# Patient Record
Sex: MALE
[De-identification: names, ages, dates, MRNs, and addresses within clinical notes are randomized per-mention and may not be internally consistent; named-entity substitution may affect disease eponyms.]

---

## 2020-11-12 ENCOUNTER — HOSPITAL ENCOUNTER (EMERGENCY)
Dept: HOSPITAL 88 - ER | Age: 40
Discharge: HOME | End: 2020-11-12
Payer: SELF-PAY

## 2020-11-12 VITALS — DIASTOLIC BLOOD PRESSURE: 74 MMHG | SYSTOLIC BLOOD PRESSURE: 114 MMHG

## 2020-11-12 VITALS — BODY MASS INDEX: 21.48 KG/M2 | WEIGHT: 145 LBS | HEIGHT: 69 IN

## 2020-11-12 DIAGNOSIS — F17.210: ICD-10-CM

## 2020-11-12 DIAGNOSIS — F15.10: Primary | ICD-10-CM

## 2020-11-12 PROCEDURE — 99283 EMERGENCY DEPT VISIT LOW MDM: CPT

## 2020-11-12 NOTE — XMS REPORT
Continuity of Care Document

                             Created on: 2020



LESLY CREWS

External Reference #: [id number]

: 1980

Sex: Male



Demographics





                          [address]                   [address] [place] [address] [place]  [address]

 

                          Home Phone                [phone]

 

                          Preferred Language        English

 

                          Marital Status            Unknown

 

                          Holiness Affiliation     Unknown

 

                          Race                      Unknown

 

                          Ethnic Group              Unknown





Author





                          Author                    [facility]

 

                          Organization              [facility]

 

                          Address                   [address]

[place]  [address]



 

                          Phone                     Unavailable







Support





                Name            Relationship    Address         Phone

 

                    NEHA COOLEY   PRS                 [address]

[address]

[place]  [address]                     [phone]

 

                    NEHA COOLEY   PRS                 [address]

[address]

[place]  [address]                     [phone]

 

                    SAMMY MCKINNEY     PRS                 [address]

[place]  [address]                     [phone]

 

                    NEHA COOLEY   PRS                 [address]

[place]  [address]                     [phone]







Care Team Providers





                    Care Team Member Name Role                Phone

 

                          Unavailable               Unavailable







Payers





           Payer Name Policy Type Policy Number Effective Date Expiration Date S

ource







Problems

This patient has no known problems.



Allergies, Adverse Reactions, Alerts





        Allergy Name Allergy Type Status  Severity Reaction(s) Onset Date Inacti

ve Date 

Treating Clinician        Comments                  Source

 

       No Known Allergies DA     Active U             2019 00:00:00       

               [facility]

 

       No Known Allergies DA     Active U             2019 00:00:00       

               [facility]

 

       No Known Allergies DA     Active U             2019 00:00:00       

               [facility]

 

       No Known Allergies DA     Active U             2017 00:00:00       

               [facility]







Medications

This patient has no known medications.



Procedures

This patient has no known procedures.



Results





           Test Description Test Time  Test Comments Results    Result Comments 

Source

 

                    VANCOMYCIN          2019 10:15:00   

 

                                        Test Item

 

             VANCOMYCIN (test code = VANCO) 15.8 UG/ML   5.0-45.0     N         

    





BASIC METABOLIC [id number] 03:33:00* 



             Test Item    Value        Reference Range Interpretation Comments

 

             SODIUM (test code = NA) 143 mmol/L   136-145      N             

 

             POTASSIUM (test code = K) 4.3 mmol/L   3.5-5.1      N             

 

             CHLORIDE (test code = CL) 112.0 mmol/L        H             

 

             CARBON DIOXIDE (test code = CO2) 26.0 mmol/L  21-32        N       

      

 

             ANION GAP (test code = GAP) 9.3          10-20        L            

 

 

             GLUCOSE (test code = GLU) 132 mg/dL           H             

 

             BLOOD UREA NITROGEN (test code = BUN) 8 mg/dL      7-18         N  

           

 

             GLOMERULAR FILTRATION RATE (test code = GFR) 34 mL/min    >=60     

                 Estimated GFR by 

using Modified MDRD formula.Chronic kidney disease is defined as either kidney 
damageor GFR <60 mL/min/1.73 m2 for >3 months.

 

             CREATININE (test code = CREAT) 2.20 mg/dL   0.7-1.3      H         

    

 

             BUN/CREATININE RATIO (test code = BUN/CREA) 3.6          10-20     

   L             

 

             CALCIUM (test code = CA) 8.0 mg/dL    8.5-10.1     L             





[id number] 03:31:00* 



             Test Item    Value        Reference Range Interpretation Comments

 

             MAGNESIUM (test code = MAG) 1.5 mg/dL    1.8-2.4      L            

 





BASIC METABOLIC [id number] 03:31:00* 



             Test Item    Value        Reference Range Interpretation Comments

 

             SODIUM (test code = NA) 143 mmol/L   136-145      N             

 

             POTASSIUM (test code = K) 4.3 mmol/L   3.5-5.1      N             

 

             CHLORIDE (test code = CL) 112.0 mmol/L        H             

 

             CARBON DIOXIDE (test code = CO2)  mmol/L      21-32                

      

 

             ANION GAP (test code = GAP)              10-20                     

 

 

             GLUCOSE (test code = GLU)  mg/dL                            

 

             BLOOD UREA NITROGEN (test code = BUN)  mg/dL       7-18            

           

 

             GLOMERULAR FILTRATION RATE (test code = GFR)  mL/min      >=60     

                  

 

             CREATININE (test code = CREAT)  mg/dL       0.7-1.3                

    

 

             BUN/CREATININE RATIO (test code = BUN/CREA)              10-20     

                 

 

             CALCIUM (test code = CA) 8.0 mg/dL    8.5-10.1     L             





CBC W/AUTO [id number] 03:13:00* 



             Test Item    Value        Reference Range Interpretation Comments

 

             WHITE BLOOD CELL (test code = WBC) 11.1 K/mm3   4.5-12.5     N     

        

 

             RED BLOOD CELL (test code = RBC) 4.41 mill/mm3 4.0-5.8      N      

       

 

             HEMOGLOBIN (test code = HGB) 14.0 gram/dL 13.0-17.5    N           

  

 

             HEMATOCRIT (test code = HCT) 43.1 %       42.0-52.0    N           

  

 

             MEAN CELL VOLUME (test code = MCV) 97.7 fL      80-98        N     

        

 

             MEAN CELL HGB (test code = MCH) 31.7 picogram 27.0-33.0    N       

      

 

             MEAN CELL HGB CONCETRATION (test code = MCHC) 32.5 gram/dL 33.0-36.

0    L             

 

             RED CELL DISTRIBUTION WIDTH (test code = RDW) 12.9 %       11.6-16.

2    N             

 

             RED CELL DISTRIBUTION WIDTH SD (test code = RDW-SD) 46.5 fL      37

.0-51.0    N             

 

             PLATELET COUNT (test code = PLT) 188 K/mm3    150-450      N       

      

 

             MEAN PLATELET VOLUME (test code = MPV) 10.7 fL      6.7-11.0     N 

            

 

             NEUTROPHIL % (test code = NT%) 93.8 %       39.0-69.0    H         

    

 

             IMMATURE GRANULOCYTE % (test code = IG%) 0.4 %        0.0-5.0      

N             

 

             LYMPHOCYTE % (test code = LY%) 3.0 %        25.0-55.0    L         

    

 

             MONOCYTE % (test code = MO%) 2.5 %        0.0-10.0     N           

  

 

             EOSINOPHIL % (test code = EO%) 0.1 %        0.0-5.0      N         

    

 

             BASOPHIL % (test code = BA%) 0.2 %        0.0-1.0      N           

  

 

             NUCLEATED RBC % (test code = NRBC%) 0.0 %        0-0          N    

         

 

             NEUTROPHIL # (test code = NT#) 10.45 K/mm3  1.8-7.7      H         

    

 

             IMMATURE GRANULOCYTE # (test code = IG#) 0.05 x10 3/uL 0-0.03      

 H             

 

             LYMPHOCYTE # (test code = LY#) 0.33 K/mm3   1.0-5.0      L         

    

 

             MONOCYTE # (test code = MO#) 0.28 K/mm3   0-0.8        N           

  

 

             EOSINOPHIL # (test code = EO#) 0.01 K/mm3   0.0-0.5      N         

    

 

             BASOPHIL # (test code = BA#) 0.02 K/mm3   0.0-0.2      N           

  

 

             NUCLEATED RBC # (test code = NRBC#) 0.00 K/mm3   0.0-0.1      N    

         

 

             MANUAL DIFF REQUIRED (test code = MDIFF) NO                        

              





CBC W/AUTO [id number] 03:12:00* 



             Test Item    Value        Reference Range Interpretation Comments

 

             WHITE BLOOD CELL (test code = WBC)  K/mm3       4.5-12.5           

        

 

             RED BLOOD CELL (test code = RBC)  mill/mm3    4.0-5.8              

      

 

             HEMOGLOBIN (test code = HGB) 14.0 gram/dL 13.0-17.5    N           

  

 

             HEMATOCRIT (test code = HCT) 43.1 %       42.0-52.0    N           

  

 

             MEAN CELL VOLUME (test code = MCV)  fL          80-98              

        

 

             MEAN CELL HGB (test code = MCH)  picogram    27.0-33.0             

     

 

             MEAN CELL HGB CONCETRATION (test code = MCHC)  gram/dL     33.0-36.

0                  

 

             RED CELL DISTRIBUTION WIDTH (test code = RDW)  %           11.6-16.

2                  

 

             RED CELL DISTRIBUTION WIDTH SD (test code = RDW-SD)  fL          37

.0-51.0                  

 

             PLATELET COUNT (test code = PLT)  K/mm3       150-450              

      

 

             MEAN PLATELET VOLUME (test code = MPV)  fL          6.7-11.0       

            

 

             NEUTROPHIL % (test code = NT%)  %           39.0-69.0              

    

 

             IMMATURE GRANULOCYTE % (test code = IG%)  %           0.0-5.0      

              

 

             LYMPHOCYTE % (test code = LY%)  %           25.0-55.0              

    

 

             MONOCYTE % (test code = MO%)  %           0.0-10.0                 

  

 

             EOSINOPHIL % (test code = EO%)  %           0.0-5.0                

    

 

             BASOPHIL % (test code = BA%)  %           0.0-1.0                  

  

 

             NEUTROPHIL # (test code = NT#)  K/mm3       1.8-7.7                

    

 

             LYMPHOCYTE # (test code = LY#)  K/mm3       1.0-5.0                

    

 

             MONOCYTE # (test code = MO#)  K/mm3       0-0.8                    

  

 

             EOSINOPHIL # (test code = EO#)  K/mm3       0.0-0.5                

    

 

             BASOPHIL # (test code = BA#)  K/mm3       0.0-0.2                  

  





HEPATIC FUNCTION [id number] 14:31:00* 



             Test Item    Value        Reference Range Interpretation Comments

 

             TOTAL PROTEIN (test code = PROT) 5.7 gram/dL  6.4-8.2      L       

      

 

             ALBUMIN (test code = ALB) 2.7 g/dL     3.4-5.0      L             

 

             GLOBULIN (test code = GLOB) 3.0 gram/dL  2.7-4.2      N            

 

 

             ALBUMIN/GLOBULIN RATIO (test code = A/G) 0.9          0.75-1.50    

N             

 

             BILIRUBIN TOTAL (test code = BILT) 1.00 mg/dL   0.0-1.0      N     

        

 

             BILIRUBIN DIRECT (test code = BILD) 0.29 mg/dL   0.0-0.20     H    

         

 

             SGOT/AST (test code = AST) 91 IUnit/L   15-37        H             

 

             SGPT/ALT (test code = ALT) 64 IUnit/L   12-78        N             

 

             ALKALINE PHOSPHATASE TOTAL (test code = ALKP) 62 IUnit/L     

     N            **Note change 

in reference range due to change in reagent.**





[id number] 14:31:00* 



             Test Item    Value        Reference Range Interpretation Comments

 

             MAGNESIUM (test code = MAG) 1.4 mg/dL    1.8-2.4      L            

 





CBC W/AUTO [id number] 13:53:00* 



             Test Item    Value        Reference Range Interpretation Comments

 

             WHITE BLOOD CELL (test code = WBC) 12.4 K/mm3   4.5-12.5     N     

        

 

             RED BLOOD CELL (test code = RBC) 4.58 mill/mm3 4.0-5.8      N      

       

 

             HEMOGLOBIN (test code = HGB) 14.8 gram/dL 13.0-17.5    N           

  

 

             HEMATOCRIT (test code = HCT) 44.1 %       42.0-52.0    N           

  

 

             MEAN CELL VOLUME (test code = MCV) 96.3 fL      80-98        N     

        

 

             MEAN CELL HGB (test code = MCH) 32.3 picogram 27.0-33.0    N       

      

 

             MEAN CELL HGB CONCETRATION (test code = MCHC) 33.6 gram/dL 33.0-36.

0    N             

 

             RED CELL DISTRIBUTION WIDTH (test code = RDW) 13.0 %       11.6-16.

2    N             

 

             RED CELL DISTRIBUTION WIDTH SD (test code = RDW-SD) 46.4 fL      37

.0-51.0    N             

 

             PLATELET COUNT (test code = PLT) 186 K/mm3    150-450      N       

      

 

             MEAN PLATELET VOLUME (test code = MPV) 10.4 fL      6.7-11.0     N 

            

 

             NEUTROPHIL % (test code = NT%) 79.7 %       39.0-69.0    H         

    

 

             IMMATURE GRANULOCYTE % (test code = IG%) 0.4 %        0.0-5.0      

N             

 

             LYMPHOCYTE % (test code = LY%) 6.4 %        25.0-55.0    L         

    

 

             MONOCYTE % (test code = MO%) 10.8 %       0.0-10.0     H           

  

 

             EOSINOPHIL % (test code = EO%) 2.4 %        0.0-5.0      N         

    

 

             BASOPHIL % (test code = BA%) 0.3 %        0.0-1.0      N           

  

 

             NUCLEATED RBC % (test code = NRBC%) 0.0 %        0-0          N    

         

 

             NEUTROPHIL # (test code = NT#) 9.91 K/mm3   1.8-7.7      H         

    

 

             IMMATURE GRANULOCYTE # (test code = IG#) 0.05 x10 3/uL 0-0.03      

 H             

 

             LYMPHOCYTE # (test code = LY#) 0.79 K/mm3   1.0-5.0      L         

    

 

             MONOCYTE # (test code = MO#) 1.34 K/mm3   0-0.8        H           

  

 

             EOSINOPHIL # (test code = EO#) 0.30 K/mm3   0.0-0.5      N         

    

 

             BASOPHIL # (test code = BA#) 0.04 K/mm3   0.0-0.2      N           

  

 

             NUCLEATED RBC # (test code = NRBC#) 0.00 K/mm3   0.0-0.1      N    

         

 

             MANUAL DIFF REQUIRED (test code = MDIFF) NO                        

              





CBC W/AUTO [id number] 13:46:00* 



             Test Item    Value        Reference Range Interpretation Comments

 

             WHITE BLOOD CELL (test code = WBC)  K/mm3       4.5-12.5           

        

 

             RED BLOOD CELL (test code = RBC)  mill/mm3    4.0-5.8              

      

 

             HEMOGLOBIN (test code = HGB) 14.8 gram/dL 13.0-17.5    N           

  

 

             HEMATOCRIT (test code = HCT) 44.1 %       42.0-52.0    N           

  

 

             MEAN CELL VOLUME (test code = MCV)  fL          80-98              

        

 

             MEAN CELL HGB (test code = MCH)  picogram    27.0-33.0             

     

 

             MEAN CELL HGB CONCETRATION (test code = MCHC)  gram/dL     33.0-36.

0                  

 

             RED CELL DISTRIBUTION WIDTH (test code = RDW)  %           11.6-16.

2                  

 

             RED CELL DISTRIBUTION WIDTH SD (test code = RDW-SD)  fL          37

.0-51.0                  

 

             PLATELET COUNT (test code = PLT)  K/mm3       150-450              

      

 

             MEAN PLATELET VOLUME (test code = MPV)  fL          6.7-11.0       

            

 

             NEUTROPHIL % (test code = NT%)  %           39.0-69.0              

    

 

             IMMATURE GRANULOCYTE % (test code = IG%)  %           0.0-5.0      

              

 

             LYMPHOCYTE % (test code = LY%)  %           25.0-55.0              

    

 

             MONOCYTE % (test code = MO%)  %           0.0-10.0                 

  

 

             EOSINOPHIL % (test code = EO%)  %           0.0-5.0                

    

 

             BASOPHIL % (test code = BA%)  %           0.0-1.0                  

  

 

             NEUTROPHIL # (test code = NT#)  K/mm3       1.8-7.7                

    

 

             LYMPHOCYTE # (test code = LY#)  K/mm3       1.0-5.0                

    

 

             MONOCYTE # (test code = MO#)  K/mm3       0-0.8                    

  

 

             EOSINOPHIL # (test code = EO#)  K/mm3       0.0-0.5                

    

 

             BASOPHIL # (test code = BA#)  K/mm3       0.0-0.2                  

  





BASIC METABOLIC [id number] 02:37:00* 



             Test Item    Value        Reference Range Interpretation Comments

 

             SODIUM (test code = NA) 141 mmol/L   136-145      N             

 

             POTASSIUM (test code = K) 3.3 mmol/L   3.5-5.1      L             

 

             CHLORIDE (test code = CL) 108.0 mmol/L        H             

 

             CARBON DIOXIDE (test code = CO2) 28.0 mmol/L  21-32        N       

      

 

             ANION GAP (test code = GAP) 8.3          10-20        L            

 

 

             GLUCOSE (test code = GLU) 81 mg/dL            N             

 

             BLOOD UREA NITROGEN (test code = BUN) 3 mg/dL      7-18         L  

           

 

             GLOMERULAR FILTRATION RATE (test code = GFR) > 60 mL/min  >=60     

                 Estimated GFR by

using Modified MDRD formula.Chronic kidney disease is defined as either kidney 
damageor GFR <60 mL/min/1.73 m2 for >3 months.

 

             CREATININE (test code = CREAT) 0.60 mg/dL   0.7-1.3      L         

    

 

             BUN/CREATININE RATIO (test code = BUN/CREA) 4.8          10-20     

   L             

 

             CALCIUM (test code = CA) 7.5 mg/dL    8.5-10.1     L             





[id number] 02:37:00* 



             Test Item    Value        Reference Range Interpretation Comments

 

             MAGNESIUM (test code = MAG) 1.6 mg/dL    1.8-2.4      L            

 





BASIC METABOLIC [id number] 02:32:00* 



             Test Item    Value        Reference Range Interpretation Comments

 

             SODIUM (test code = NA) 141 mmol/L   136-145      N             

 

             POTASSIUM (test code = K) 3.3 mmol/L   3.5-5.1      L             

 

             CHLORIDE (test code = CL) 108.0 mmol/L        H             

 

             CARBON DIOXIDE (test code = CO2)  mmol/L      21-32                

      

 

             ANION GAP (test code = GAP)              10-20                     

 

 

             GLUCOSE (test code = GLU)  mg/dL                            

 

             BLOOD UREA NITROGEN (test code = BUN)  mg/dL       7-18            

           

 

             GLOMERULAR FILTRATION RATE (test code = GFR)  mL/min      >=60     

                  

 

             CREATININE (test code = CREAT)  mg/dL       0.7-1.3                

    

 

             BUN/CREATININE RATIO (test code = BUN/CREA)              10-20     

                 

 

             CALCIUM (test code = CA)  mg/dL       8.5-10.1                   





[id number] 02:32:00* 



             Test Item    Value        Reference Range Interpretation Comments

 

             MAGNESIUM (test code = MAG)  mg/dL       1.8-2.4                   

 





CBC W/AUTO [id number] 02:14:00* 



             Test Item    Value        Reference Range Interpretation Comments

 

             WHITE BLOOD CELL (test code = WBC) 10.4 K/mm3   4.5-12.5     N     

        

 

             RED BLOOD CELL (test code = RBC) 4.38 mill/mm3 4.0-5.8      N      

       

 

             HEMOGLOBIN (test code = HGB) 13.8 gram/dL 13.0-17.5    N           

  

 

             HEMATOCRIT (test code = HCT) 43.4 %       42.0-52.0    N           

  

 

             MEAN CELL VOLUME (test code = MCV) 99.1 fL      80-98        H     

        

 

             MEAN CELL HGB (test code = MCH) 31.5 picogram 27.0-33.0    N       

      

 

             MEAN CELL HGB CONCETRATION (test code = MCHC) 31.8 gram/dL 33.0-36.

0    L             

 

             RED CELL DISTRIBUTION WIDTH (test code = RDW) 13.2 %       11.6-16.

2    N             

 

             RED CELL DISTRIBUTION WIDTH SD (test code = RDW-SD) 48.6 fL      37

.0-51.0    N             

 

             PLATELET COUNT (test code = PLT) 169 K/mm3    150-450      N       

      

 

             MEAN PLATELET VOLUME (test code = MPV) 10.6 fL      6.7-11.0     N 

            

 

             NEUTROPHIL % (test code = NT%) 69.6 %       39.0-69.0    H         

    

 

             IMMATURE GRANULOCYTE % (test code = IG%) 0.4 %        0.0-5.0      

N             

 

             LYMPHOCYTE % (test code = LY%) 14.5 %       25.0-55.0    L         

    

 

             MONOCYTE % (test code = MO%) 9.9 %        0.0-10.0     N           

  

 

             EOSINOPHIL % (test code = EO%) 5.5 %        0.0-5.0      H         

    

 

             BASOPHIL % (test code = BA%) 0.1 %        0.0-1.0      N           

  

 

             NUCLEATED RBC % (test code = NRBC%) 0.0 %        0-0          N    

         

 

             NEUTROPHIL # (test code = NT#) 7.21 K/mm3   1.8-7.7      N         

    

 

             IMMATURE GRANULOCYTE # (test code = IG#) 0.04 x10 3/uL 0-0.03      

 H             

 

             LYMPHOCYTE # (test code = LY#) 1.50 K/mm3   1.0-5.0      N         

    

 

             MONOCYTE # (test code = MO#) 1.03 K/mm3   0-0.8        H           

  

 

             EOSINOPHIL # (test code = EO#) 0.57 K/mm3   0.0-0.5      H         

    

 

             BASOPHIL # (test code = BA#) 0.01 K/mm3   0.0-0.2      N           

  

 

             NUCLEATED RBC # (test code = NRBC#) 0.00 K/mm3   0.0-0.1      N    

         

 

             MANUAL DIFF REQUIRED (test code = MDIFF) NO                        

              





COMPREHENSIVE METABOLIC [id number] 09:01:00* 



             Test Item    Value        Reference Range Interpretation Comments

 

             SODIUM (test code = NA) 142 mmol/L   136-145      N             

 

             POTASSIUM (test code = K) 4.2 mmol/L   3.5-5.1      N             

 

             CHLORIDE (test code = CL) 111.0 mmol/L        H             

 

             CARBON DIOXIDE (test code = CO2) 26.0 mmol/L  21-32        N       

      

 

             ANION GAP (test code = GAP) 9.2          10-20        L            

 

 

             GLUCOSE (test code = GLU) 83 mg/dL            N             

 

             BLOOD UREA NITROGEN (test code = BUN) 4 mg/dL      7-18         L  

           

 

             GLOMERULAR FILTRATION RATE (test code = GFR) > 60 mL/min  >=60     

                 Estimated GFR by

using Modified MDRD formula.Chronic kidney disease is defined as either kidney 
damageor GFR <60 mL/min/1.73 m2 for >3 months.

 

             CREATININE (test code = CREAT) 0.60 mg/dL   0.7-1.3      L         

    

 

             BUN/CREATININE RATIO (test code = BUN/CREA) 6.2          10-20     

   L             

 

             TOTAL PROTEIN (test code = PROT) 5.4 gram/dL  6.4-8.2      L       

      

 

             ALBUMIN (test code = ALB) 2.8 g/dL     3.4-5.0      L             

 

             GLOBULIN (test code = GLOB) 2.6 gram/dL  2.7-4.2      L            

 

 

             ALBUMIN/GLOBULIN RATIO (test code = A/G) 1.1          0.75-1.50    

N             

 

             CALCIUM (test code = CA) 7.8 mg/dL    8.5-10.1     L             

 

             BILIRUBIN TOTAL (test code = BILT) 0.80 mg/dL   0.0-1.0      N     

        

 

             SGOT/AST (test code = AST) 73 IUnit/L   15-37        H             

 

             SGPT/ALT (test code = ALT) 66 IUnit/L   12-78        N             

 

             ALKALINE PHOSPHATASE TOTAL (test code = ALKP) 64 IUnit/L     

     N            **Note change 

in reference range due to change in reagent.**





CBC W/AUTO [id number] 08:28:00* 



             Test Item    Value        Reference Range Interpretation Comments

 

             WHITE BLOOD CELL (test code = WBC) 9.1 K/mm3    4.5-12.5     N     

        

 

             RED BLOOD CELL (test code = RBC) 4.54 mill/mm3 4.0-5.8      N      

       

 

             HEMOGLOBIN (test code = HGB) 14.7 gram/dL 13.0-17.5                

 RESULT VERIFIED BY REPEAT 

ANALYSIS

 

             HEMATOCRIT (test code = HCT) 44.9 %       42.0-52.0    N           

  

 

             MEAN CELL VOLUME (test code = MCV) 98.9 fL      80-98        H     

        

 

             MEAN CELL HGB (test code = MCH) 32.4 picogram 27.0-33.0    N       

      

 

             MEAN CELL HGB CONCETRATION (test code = MCHC) 32.7 gram/dL 33.0-36.

0    L             

 

             RED CELL DISTRIBUTION WIDTH (test code = RDW) 13.7 %       11.6-16.

2    N             

 

             RED CELL DISTRIBUTION WIDTH SD (test code = RDW-SD) 50.4 fL      37

.0-51.0    N             

 

             PLATELET COUNT (test code = PLT) 213 K/mm3    150-450              

     RESULT VERIFIED BY REPEAT 

ANALYSIS

 

             MEAN PLATELET VOLUME (test code = MPV) 9.9 fL       6.7-11.0     N 

            

 

             NEUTROPHIL % (test code = NT%) 71.9 %       39.0-69.0    H         

    

 

             IMMATURE GRANULOCYTE % (test code = IG%) 0.3 %        0.0-5.0      

N             

 

             LYMPHOCYTE % (test code = LY%) 9.9 %        25.0-55.0    L         

    

 

             MONOCYTE % (test code = MO%) 12.8 %       0.0-10.0     H           

  

 

             EOSINOPHIL % (test code = EO%) 5.0 %        0.0-5.0      N         

    

 

             BASOPHIL % (test code = BA%) 0.1 %        0.0-1.0      N           

  

 

             NUCLEATED RBC % (test code = NRBC%) 0.0 %        0-0          N    

         

 

             NEUTROPHIL # (test code = NT#) 6.54 K/mm3   1.8-7.7      N         

    

 

             IMMATURE GRANULOCYTE # (test code = IG#) 0.03 x10 3/uL 0-0.03      

 N             

 

             LYMPHOCYTE # (test code = LY#) 0.90 K/mm3   1.0-5.0      L         

    

 

             MONOCYTE # (test code = MO#) 1.16 K/mm3   0-0.8        H           

  

 

             EOSINOPHIL # (test code = EO#) 0.45 K/mm3   0.0-0.5      N         

    

 

             BASOPHIL # (test code = BA#) 0.01 K/mm3   0.0-0.2      N           

  

 

             NUCLEATED RBC # (test code = NRBC#) 0.00 K/mm3   0.0-0.1      N    

         





[id number] 03:36:00* 



             Test Item    Value        Reference Range Interpretation Comments

 

             GLYCOSYLATED HEMOGLOBIN (HA1C) (test code = GLYHGB) 4.9 % HbA1     

                        SUGGESTED 

DIAGNOSIS:    HbA1C (%)----------------------  -----------Diabetic              
 >6.4Prediabetes              5.7 - 6.4Normal                  <5.7

 

             ESTIMATED AVERAGE GLUCOSE (test code = EAG) 94 MG/DL               

                 





DRUGS OF ABUSE SCREEN [id number] 00:43:00* 



             Test Item    Value        Reference Range Interpretation Comments

 

             UA PH DIPSTICK (test code = ANJEL)              5.0-8.0              

      

 

             URN COCAINE (test code = COCAURN) POSITIVE     <300 ng/mL   A      

      This test provides only 

a preliminary test result.  A morespecific alternate chemical method must be 
used in order toobtain a confirmed analytical result.  Gas chromatography/mass 
spectrometry (GC/MS) is thepreferred confirmatory method.  Other chemical 
confirmationmethods are available.  Clinical consideration and professional 
judgment should be applied to any drug of abusetest result, particularly when 
preliminary positive resultsare used.Unconfirmed screening results must not be 
used fornon-medical purposes (e.g., employment testing, legaltesting).

 

             URN CANNABINOIDS (test code = CANNABURN) NEGATIVE     <50 ng/mL    

              

 

             URN AMPHETAMINE (test code = AMPHETURN) NEGATIVE     <1000 ng/mL   

             

 

             URN BARBITURATE (test code = BARBITURN) NEGATIVE     <200 ng/mL    

             

 

             URN BENZODIAZEPINE (test code = BENZOURN) NEGATIVE     <200 ng/mL  

               

 

             URN OPIATES (test code = OPIATURN) NEGATIVE     <300 ng/mL         

        

 

             URN PHENCYCLIDINE (PCP) (test code = PHENCURN) NEGATIVE     <25 ng/

mL                  

 

             URN METHADONE (test code = METHAURN) NEGATIVE     <300 ng/mL       

          





DRUGS OF ABUSE SCREEN [id number] 00:43:00* 



             Test Item    Value        Reference Range Interpretation Comments

 

             UA PH DIPSTICK (test code = ANJEL) 5.0          5.0-8.0              

      

 

             URN COCAINE (test code = COCAURN) POSITIVE     <300 ng/mL   A      

      This test provides only 

a preliminary test result.  A morespecific alternate chemical method must be 
used in order toobtain a confirmed analytical result.  Gas chromatography/mass 
spectrometry (GC/MS) is thepreferred confirmatory method.  Other chemical 
confirmationmethods are available.  Clinical consideration and professional 
judgment should be applied to any drug of abusetest result, particularly when 
preliminary positive resultsare used.Unconfirmed screening results must not be 
used fornon-medical purposes (e.g., employment testing, legaltesting).

 

             URN CANNABINOIDS (test code = CANNABURN) NEGATIVE     <50 ng/mL    

              

 

             URN AMPHETAMINE (test code = AMPHETURN) NEGATIVE     <1000 ng/mL   

             

 

             URN BARBITURATE (test code = BARBITURN) NEGATIVE     <200 ng/mL    

             

 

             URN BENZODIAZEPINE (test code = BENZOURN) NEGATIVE     <200 ng/mL  

               

 

             URN OPIATES (test code = OPIATURN) NEGATIVE     <300 ng/mL         

        

 

             URN PHENCYCLIDINE (PCP) (test code = PHENCURN) NEGATIVE     <25 ng/

mL                  

 

             URN METHADONE (test code = METHAURN) NEGATIVE     <300 ng/mL       

          





URINALYSIS [id number] 22:26:00* 



             Test Item    Value        Reference Range Interpretation Comments

 

             UA COLOR (test code = COLU) Light-Yellow YELLOW                    

 

 

             UA APPEARANCE (test code = APPU) CLEAR        CLEAR                

      

 

             UA GLUCOSE DIPSTICK (test code = DGLUU) NEGATIVE mg/dL NEGATIVE    

               

 

             UA BILIRUBIN DIPSTICK (test code = BILU) NEGATIVE mg/dL NEGATIVE   

                

 

             UA KETONE DIPSTICK (test code = KETU) NEGATIVE mg/dL NEGATIVE      

             

 

             UA SPECIFIC GRAVITY (test code = SGU) 1.009        1.001-1.035     

           

 

             UA BLOOD DIPSTICK (test code = VERNON) Negative mg/dL NEGATIVE        

           

 

             UA PH DIPSTICK (test code = ANJEL) 5.0          5.0-8.0              

      

 

             UA PROTEIN DIPSTICK (test code = PROU) NEGATIVE mg/dL NEGATIVE     

              

 

             UA UROBILINIOGEN DIPSTICK (test code = URO) Normal mg/dL NEGATIVE  

                 

 

             UA NITRITE DIPSTICK (test code = PETE) NEGATIVE     NEGATIVE       

            

 

             UA LEUKOCYTE ESTERASE W REFLEX (test code = LEUUR) NEGATIVE Giles/uL 

NEGATIVE                   

 

             UA WBC (test code = WBCU) 0-5 per HPF  0-5                        

 

             UA RBC (test code = RBCU) 0-2 #/HPF    0-5                        

 

             UA EPITHELIAL CELLS (test code = EPIU) None seen per HPF Few       

                 

 

             UA BACTERIA (test code = BACU) NONE SEEN per HPF NONE              

         

 

             UA HYALINE CAST (test code = HYALU) 6-10 #/LPF   0-5          A    

         

 

             UA MUCUS (test code = MUCU) FEW #/LPF    FEW                       

 





Urine Source? Clean CatchURINALYSIS [id number] 22:19:00* 



             Test Item    Value        Reference Range Interpretation Comments

 

             UA COLOR (test code = COLU) Light-Yellow YELLOW                    

 

 

             UA APPEARANCE (test code = APPU) CLEAR        CLEAR                

      

 

             UA GLUCOSE DIPSTICK (test code = DGLUU) NEGATIVE mg/dL NEGATIVE    

               

 

             UA BILIRUBIN DIPSTICK (test code = BILU) NEGATIVE mg/dL NEGATIVE   

                

 

             UA KETONE DIPSTICK (test code = KETU) NEGATIVE mg/dL NEGATIVE      

             

 

             UA SPECIFIC GRAVITY (test code = SGU) 1.009        1.001-1.035     

           

 

             UA BLOOD DIPSTICK (test code = VERNON) Negative mg/dL NEGATIVE        

           

 

             UA PH DIPSTICK (test code = ANJEL) 5.0          5.0-8.0              

      

 

             UA PROTEIN DIPSTICK (test code = PROU) NEGATIVE mg/dL NEGATIVE     

              

 

             UA UROBILINIOGEN DIPSTICK (test code = URO) Normal mg/dL NEGATIVE  

                 

 

             UA NITRITE DIPSTICK (test code = PETE) NEGATIVE     NEGATIVE       

            

 

             UA LEUKOCYTE ESTERASE W REFLEX (test code = LEUUR) NEGATIVE Giles/uL 

NEGATIVE                   

 

             UA WBC (test code = WBCU) 0-5 per HPF  0-5                        

 

             UA RBC (test code = RBCU) 0-2 #/HPF    0-5                        

 

             UA EPITHELIAL CELLS (test code = EPIU)  per HPF     Few            

            

 

             UA BACTERIA (test code = BACU)  per HPF     NONE                   

    

 

             UA HYALINE CAST (test code = HYALU) 6-10 #/LPF   0-5          A    

         

 

             UA MUCUS (test code = MUCU) FEW #/LPF    FEW                       

 





Urine Source? Clean CatchLACTIC [id number] 21:46:00* 



             Test Item    Value        Reference Range Interpretation Comments

 

             LACTIC ACID (test code = LACT) 1.9 mmol/L   0.4-1.9      N         

    





CBC W/AUTO [id number] 21:30:00* 



             Test Item    Value        Reference Range Interpretation Comments

 

             WHITE BLOOD CELL (test code = WBC) 15.8 K/mm3   4.5-12.5     H     

        

 

             RED BLOOD CELL (test code = RBC) 5.38 mill/mm3 4.0-5.8      N      

       

 

             HEMOGLOBIN (test code = HGB) 17.4 gram/dL 13.0-17.5    N           

  

 

             HEMATOCRIT (test code = HCT) 51.4 %       42.0-52.0    N           

  

 

             MEAN CELL VOLUME (test code = MCV) 95.5 fL      80-98        N     

        

 

             MEAN CELL HGB (test code = MCH) 32.3 picogram 27.0-33.0    N       

      

 

             MEAN CELL HGB CONCETRATION (test code = MCHC) 33.9 gram/dL 33.0-36.

0    N             

 

             RED CELL DISTRIBUTION WIDTH (test code = RDW) 13.6 %       11.6-16.

2    N             

 

             RED CELL DISTRIBUTION WIDTH SD (test code = RDW-SD) 48.6 fL      37

.0-51.0    N             

 

             PLATELET COUNT (test code = PLT) 292 K/mm3    150-450      N       

      

 

             MEAN PLATELET VOLUME (test code = MPV) 9.8 fL       6.7-11.0     N 

            

 

             NEUTROPHIL % (test code = NT%) 74.2 %       39.0-69.0    H         

    

 

             IMMATURE GRANULOCYTE % (test code = IG%) 0.3 %        0.0-5.0      

N             

 

             LYMPHOCYTE % (test code = LY%) 10.0 %       25.0-55.0    L         

    

 

             MONOCYTE % (test code = MO%) 10.5 %       0.0-10.0     H           

  

 

             EOSINOPHIL % (test code = EO%) 4.6 %        0.0-5.0      N         

    

 

             BASOPHIL % (test code = BA%) 0.4 %        0.0-1.0      N           

  

 

             NUCLEATED RBC % (test code = NRBC%) 0.0 %        0-0          N    

         

 

             NEUTROPHIL # (test code = NT#) 11.73 K/mm3  1.8-7.7      H         

    

 

             IMMATURE GRANULOCYTE # (test code = IG#) 0.05 x10 3/uL 0-0.03      

 H             

 

             LYMPHOCYTE # (test code = LY#) 1.59 K/mm3   1.0-5.0      N         

    

 

             MONOCYTE # (test code = MO#) 1.67 K/mm3   0-0.8        H           

  

 

             EOSINOPHIL # (test code = EO#) 0.73 K/mm3   0.0-0.5      H         

    

 

             BASOPHIL # (test code = BA#) 0.06 K/mm3   0.0-0.2      N           

  

 

             NUCLEATED RBC # (test code = NRBC#) 0.00 K/mm3   0.0-0.1      N    

         

 

             MANUAL DIFF REQUIRED (test code = MDIFF) NO                        

              





BASIC METABOLIC [id number] 21:29:00* 



             Test Item    Value        Reference Range Interpretation Comments

 

             SODIUM (test code = NA) 140 mmol/L   136-145      N             

 

             POTASSIUM (test code = K) 3.9 mmol/L   3.5-5.1      N             

 

             CHLORIDE (test code = CL) 107.0 mmol/L        N             

 

             CARBON DIOXIDE (test code = CO2) 26.0 mmol/L  21-32        N       

      

 

             ANION GAP (test code = GAP) 10.9         10-20        N            

 

 

             GLUCOSE (test code = GLU) 155 mg/dL           H             

 

             BLOOD UREA NITROGEN (test code = BUN) 3 mg/dL      7-18         L  

           

 

             GLOMERULAR FILTRATION RATE (test code = GFR) > 60 mL/min  >=60     

                 Estimated GFR by

using Modified MDRD formula.Chronic kidney disease is defined as either kidney 
damageor GFR <60 mL/min/1.73 m2 for >3 months.

 

             CREATININE (test code = CREAT) 0.70 mg/dL   0.7-1.3      N         

    

 

             BUN/CREATININE RATIO (test code = BUN/CREA) 4.1          10-20     

   L             

 

             CALCIUM (test code = CA) 8.4 mg/dL    8.5-10.1     L             





HEPATIC FUNCTION [id number] 21:29:00* 



             Test Item    Value        Reference Range Interpretation Comments

 

             TOTAL PROTEIN (test code = PROT) 7.7 gram/dL  6.4-8.2      N       

      

 

             ALBUMIN (test code = ALB) 3.8 g/dL     3.4-5.0      N             

 

             GLOBULIN (test code = GLOB) 3.9 gram/dL  2.7-4.2      N            

 

 

             ALBUMIN/GLOBULIN RATIO (test code = A/G) 1.0          0.75-1.50    

N             

 

             BILIRUBIN TOTAL (test code = BILT) 0.40 mg/dL   0.0-1.0      N     

        

 

             BILIRUBIN DIRECT (test code = BILD) 0.32 mg/dL   0.0-0.20     H    

         

 

             SGOT/AST (test code = AST) 132 IUnit/L  15-37        H             

 

             SGPT/ALT (test code = ALT) 106 IUnit/L  12-78        H             

 

             ALKALINE PHOSPHATASE TOTAL (test code = ALKP) 89 IUnit/L     

     N            **Note change 

in reference range due to change in reagent.**





[id number] 21:29:00* 



             Test Item    Value        Reference Range Interpretation Comments

 

             TROPONIN-I (test code = TROPI) <0.015 ng/mL 0-0.045      N         

    





CBC W/AUTO [id number] 21:19:00* 



             Test Item    Value        Reference Range Interpretation Comments

 

             WHITE BLOOD CELL (test code = WBC)  K/mm3       4.5-12.5           

        

 

             RED BLOOD CELL (test code = RBC)  mill/mm3    4.0-5.8              

      

 

             HEMOGLOBIN (test code = HGB) 17.4 gram/dL 13.0-17.5    N           

  

 

             HEMATOCRIT (test code = HCT) 51.4 %       42.0-52.0    N           

  

 

             MEAN CELL VOLUME (test code = MCV)  fL          80-98              

        

 

             MEAN CELL HGB (test code = MCH)  picogram    27.0-33.0             

     

 

             MEAN CELL HGB CONCETRATION (test code = MCHC)  gram/dL     33.0-36.

0                  

 

             RED CELL DISTRIBUTION WIDTH (test code = RDW)  %           11.6-16.

2                  

 

             RED CELL DISTRIBUTION WIDTH SD (test code = RDW-SD)  fL          37

.0-51.0                  

 

             PLATELET COUNT (test code = PLT)  K/mm3       150-450              

      

 

             MEAN PLATELET VOLUME (test code = MPV)  fL          6.7-11.0       

            

 

             NEUTROPHIL % (test code = NT%)  %           39.0-69.0              

    

 

             IMMATURE GRANULOCYTE % (test code = IG%)  %           0.0-5.0      

              

 

             LYMPHOCYTE % (test code = LY%)  %           25.0-55.0              

    

 

             MONOCYTE % (test code = MO%)  %           0.0-10.0                 

  

 

             EOSINOPHIL % (test code = EO%)  %           0.0-5.0                

    

 

             BASOPHIL % (test code = BA%)  %           0.0-1.0                  

  

 

             NEUTROPHIL # (test code = NT#)  K/mm3       1.8-7.7                

    

 

             LYMPHOCYTE # (test code = LY#)  K/mm3       1.0-5.0                

    

 

             MONOCYTE # (test code = MO#)  K/mm3       0-0.8                    

  

 

             EOSINOPHIL # (test code = EO#)  K/mm3       0.0-0.5                

    

 

             BASOPHIL # (test code = BA#)  K/mm3       0.0-0.2                  

  





BASIC METABOLIC [id number] 21:17:00* 



             Test Item    Value        Reference Range Interpretation Comments

 

             SODIUM (test code = NA) 140 mmol/L   136-145      N             

 

             POTASSIUM (test code = K) 3.9 mmol/L   3.5-5.1      N             

 

             CHLORIDE (test code = CL) 107.0 mmol/L        N             

 

             CARBON DIOXIDE (test code = CO2)  mmol/L      21-32                

      

 

             ANION GAP (test code = GAP)              10-20                     

 

 

             GLUCOSE (test code = GLU)  mg/dL                            

 

             BLOOD UREA NITROGEN (test code = BUN)  mg/dL       7-18            

           

 

             GLOMERULAR FILTRATION RATE (test code = GFR)  mL/min      >=60     

                  

 

             CREATININE (test code = CREAT)  mg/dL       0.7-1.3                

    

 

             BUN/CREATININE RATIO (test code = BUN/CREA)              10-20     

                 

 

             CALCIUM (test code = CA)  mg/dL       8.5-10.1                   





HEPATIC FUNCTION [id number] 21:17:00* 



             Test Item    Value        Reference Range Interpretation Comments

 

             TOTAL PROTEIN (test code = PROT)  gram/dL     6.4-8.2              

      

 

             ALBUMIN (test code = ALB)  g/dL        3.4-5.0                    

 

             GLOBULIN (test code = GLOB)  gram/dL     2.7-4.2                   

 

 

             ALBUMIN/GLOBULIN RATIO (test code = A/G)              0.75-1.50    

              

 

             BILIRUBIN TOTAL (test code = BILT)  mg/dL       0.0-1.0            

        

 

             BILIRUBIN DIRECT (test code = BILD)  mg/dL       0.0-0.20          

         

 

             SGOT/AST (test code = AST)  IUnit/L     15-37                      

 

             SGPT/ALT (test code = ALT)  IUnit/L     12-78                      

 

             ALKALINE PHOSPHATASE TOTAL (test code = ALKP)  IUnit/L       

                   





[id number] 21:17:00* 



             Test Item    Value        Reference Range Interpretation Comments

 

             TROPONIN-I (test code = TROPI)  ng/mL       0-0.045                

    





- XR FOREARM 2 VIEWS [id number] 20:37:00 FAX: Patrick Tejeda 
[phone]    [place]:    St: [facility] FAX:         Kristina Ngo NP                
    ----------------------------------------
---------------------------------------  Name:   RANDALL CREWS                     
 [facility]                     : 1980  Age/S: 38/M           [address] 
[facility]                Unit #: [id number]      Loc: [place]  [address]              Phys: Kristina Ngo NP                                    
                Acct: [id number] Dis Date:               Status: REG ER       
                         PHONE #: [phone]     Exam Date:     2019                   FAX #: [phone]     Reason: swelling and tenderness  
                         EXAMS:                                               
CPT CODE:      [id number] XR FOREARM 2 VIEWS LT                      [id number]       
                                    REASON FOR EXAM: swelling and tenderness    
            EXAM ORDER DATE: 2019 8:16 PM               Ordering: Kristina Ngo NP       Attending:Patrick Munroe MD       Location:               
PROCEDURE:  - XR FOREARM 2 VIEWS LT               FINDINGS: 2 views of the left 
forearm were obtained. The osseous       structures are unremarkable in size and
shape. The joint spaces are       maintained. No evidence of fracture.          
      IMPRESSION: Diffuse subcutaneous edema.  No evidence of osseous lesion    
     ** Electronically Signed by ELENI Stokes on 2019 at  **          
           Reported and signed by: Jared Stokes M.D.                     CC: 
Patrick Munroe MD; Kristina Ngo NP                                          
                                                       Technologist: KAROLINE GUILLEN (R)                          Trnscrd Date/Time/By: 2019 (2
) : By: GurdeepL           Orig Print D/T: S: 2019 ()             
           PAGE  1                       Signed Report                          
    - XR CHEST 1 -20-80 20:37:00 FAX:         Kristina Ngo NP              
       [place]:    St: REG----------
---------------------------------------------------------------------  Name:   RANDALL URIARTE                       [facility]                     : [birth year]  Age/S: 38/M           [address] [facility]                Unit #: [id number]    
 Loc: [place]  [address]              Phys: Kristina Ngo NP      
                                              Acct: [id number] Dis Date:      
        Status: REG ER                                 PHONE #: [phone]    
Exam Date:     2019                   FAX #: [phone]     
Reason: CODE SEPSIS                                        EXAMS:               
                               CPT CODE:      [id number] XR CHEST 1 V            
                  [id number]                            REASON FOR EXAM: CODE SEPSIS 
             EXAM ORDER DATE: 2019 8:09 PM               Ordering: Kristina Ngo NP       Attending:Patrick Munroe MD       Location:               
PROCEDURE:  - XR CHEST 1 V               COMPARISON:               FINDINGS:  
Portable AP frontal view of the chest obtained at 8:25 PM       shows clear funmilayo
gs without evidence of consolidation. There is no       evidence of effusion. Th
e heart size is within normal limits.       Pulmonary vasculatures are unremarka
ble.                  IMPRESSION: No active disease.          ** Electronically 
Signed by ELENI Stokes on 2019 at  **                      Reported an
d signed by: Jared Stokes M.D.                     CC: Kristina Ngo NP              
                                                                                
                         Technologist: KAROLINE MEDEL RT (R)            
             Trnscrd Date/Time/By: 2019 () : By: LianVTL           
Orig Print D/T: S: 2019 ()                         PAGE  1            
          Signed Report                               URINALYSIS COMPLETE
2019 08:01:00* 



             Test Item    Value        Reference Range Interpretation Comments

 

             UA COLOR (test code = COLU) COLORLESS    YELLOW       A            

 

 

             UA APPEARANCE (test code = APPU) CLEAR        CLEAR                

      

 

             UA GLUCOSE DIPSTICK (test code = DGLUU) NEGATIVE mg/dL NEGATIVE    

               

 

             UA BILIRUBIN DIPSTICK (test code = BILU) NEGATIVE mg/dL NEGATIVE   

                

 

             UA KETONE DIPSTICK (test code = KETU) NEGATIVE mg/dL NEGATIVE      

             

 

             UA SPECIFIC GRAVITY (test code = SGU) 1.002        1.001-1.035     

           

 

             UA BLOOD DIPSTICK (test code = VERNON) Negative mg/dL NEGATIVE        

           

 

             UA PH DIPSTICK (test code = ANJEL) 5.5          5.0-8.0              

      

 

             UA PROTEIN DIPSTICK (test code = PROU) NEGATIVE mg/dL NEGATIVE     

              

 

             UA UROBILINIOGEN DIPSTICK (test code = URO) Normal mg/dL NEGATIVE  

                 

 

             UA NITRITE DIPSTICK (test code = PETE) NEGATIVE     NEGATIVE       

            

 

             UA LEUKOCYTE ESTERASE W REFLEX (test code = LEUUR) NEGATIVE Giles/uL 

NEGATIVE                   

 

             UA WBC (test code = WBCU) 0-5 per HPF  0-5                        

 

             UA RBC (test code = RBCU) NONE SEEN #/HPF 0-5                      

  

 

             UA EPITHELIAL CELLS (test code = EPIU) None seen per HPF FEW       

                 

 

             UA BACTERIA (test code = BACU) NONE SEEN #/HPF NONE                

       





Urine Source? Clean CatchDRUGS OF ABUSE SCREEN [id number] 08:01:00* 



             Test Item    Value        Reference Range Interpretation Comments

 

             URN COCAINE (test code = COCAURN) POSITIVE     <300 ng/mL   A      

      This test provides only 

a preliminary test result.  A morespecific alternate chemical method must be 
used in order toobtain a confirmed analytical result.  Gas chromatography/mass 
spectrometry (GC/MS) is thepreferred confirmatory method.  Other chemical 
confirmationmethods are available.  Clinical consideration and professional 
judgment should be applied to any drug of abusetest result, particularly when 
preliminary positive resultsare used.Unconfirmed screening results must not be 
used fornon-medical purposes (e.g., employment testing, legaltesting).

 

             URN CANNABINOIDS (test code = CANNABURN) POSITIVE     <50 ng/mL    

A            This test provides

only a preliminary test result.  A morespecific alternate chemical method must 
be used in order toobtain a confirmed analytical result.  Gas 
chromatography/mass spectrometry (GC/MS) is thepreferred confirmatory method.  
Other chemical confirmationmethods are available.  Clinical consideration and 
professional judgment should be applied to any drug of abusetest result, 
particularly when preliminary positive resultsare used.Unconfirmed screening 
results must not be used fornon-medical purposes (e.g., employment testing, 
legaltesting).

 

             URN AMPHETAMINE (test code = AMPHETURN) POSITIVE     <1000 ng/mL  A

            This test 

provides only a preliminary test result.  A morespecific alternate chemical 
method must be used in order toobtain a confirmed analytical result.  Gas 
chromatography/mass spectrometry (GC/MS) is thepreferred confirmatory method.  
Other chemical confirmationmethods are available.  Clinical consideration and 
professional judgment should be applied to any drug of abusetest result, 
particularly when preliminary positive resultsare used.Unconfirmed screening 
results must not be used fornon-medical purposes (e.g., employment testing, 
legaltesting).

 

             URN BARBITURATE (test code = BARBITURN) NEGATIVE     <200 ng/mL    

             

 

             URN BENZODIAZEPINE (test code = BENZOURN) NEGATIVE     <200 ng/mL  

               

 

             URN OPIATES (test code = OPIATURN) NEGATIVE     <300 ng/mL         

        

 

             URN PHENCYCLIDINE (PCP) (test code = PHENCURN) NEGATIVE     <25 ng/

mL                  

 

             URN METHADONE (test code = METHAURN) NEGATIVE     <300 ng/mL       

          





Urine Source? Clean CatchURINALYSIS [id number] 07:59:00* 



             Test Item    Value        Reference Range Interpretation Comments

 

             UA COLOR (test code = COLU) COLORLESS    YELLOW       A            

 

 

             UA APPEARANCE (test code = APPU) CLEAR        CLEAR                

      

 

             UA GLUCOSE DIPSTICK (test code = DGLUU) NEGATIVE mg/dL NEGATIVE    

               

 

             UA BILIRUBIN DIPSTICK (test code = BILU) NEGATIVE mg/dL NEGATIVE   

                

 

             UA KETONE DIPSTICK (test code = KETU) NEGATIVE mg/dL NEGATIVE      

             

 

             UA SPECIFIC GRAVITY (test code = SGU) 1.002        1.001-1.035     

           

 

             UA BLOOD DIPSTICK (test code = VERNON) Negative mg/dL NEGATIVE        

           

 

             UA PH DIPSTICK (test code = ANJEL) 5.5          5.0-8.0              

      

 

             UA PROTEIN DIPSTICK (test code = PROU) NEGATIVE mg/dL NEGATIVE     

              

 

             UA UROBILINIOGEN DIPSTICK (test code = URO) Normal mg/dL NEGATIVE  

                 

 

             UA NITRITE DIPSTICK (test code = PETE) NEGATIVE     NEGATIVE       

            

 

             UA LEUKOCYTE ESTERASE W REFLEX (test code = LEUUR) NEGATIVE Giles/uL 

NEGATIVE                   

 

             UA WBC (test code = WBCU)  per HPF     0-5                        

 

             UA RBC (test code = RBCU)  per HPF     0-5                        

 

             UA EPITHELIAL CELLS (test code = EPIU)  per HPF     Few            

            

 

             UA BACTERIA (test code = BACU)  per HPF     NONE                   

    





Urine Source? Clean CatchDRUGS OF ABUSE SCREEN [id number] 07:59:00* 



             Test Item    Value        Reference Range Interpretation Comments

 

             URN COCAINE (test code = COCAURN) POSITIVE     <300 ng/mL   A      

      This test provides only 

a preliminary test result.  A morespecific alternate chemical method must be 
used in order toobtain a confirmed analytical result.  Gas chromatography/mass 
spectrometry (GC/MS) is thepreferred confirmatory method.  Other chemical 
confirmationmethods are available.  Clinical consideration and professional 
judgment should be applied to any drug of abusetest result, particularly when 
preliminary positive resultsare used.Unconfirmed screening results must not be 
used fornon-medical purposes (e.g., employment testing, legaltesting).

 

             URN CANNABINOIDS (test code = CANNABURN) POSITIVE     <50 ng/mL    

A            This test provides

only a preliminary test result.  A morespecific alternate chemical method must 
be used in order toobtain a confirmed analytical result.  Gas 
chromatography/mass spectrometry (GC/MS) is thepreferred confirmatory method.  
Other chemical confirmationmethods are available.  Clinical consideration and 
professional judgment should be applied to any drug of abusetest result, 
particularly when preliminary positive resultsare used.Unconfirmed screening 
results must not be used fornon-medical purposes (e.g., employment testing, 
legaltesting).

 

             URN AMPHETAMINE (test code = AMPHETURN) POSITIVE     <1000 ng/mL  A

            This test 

provides only a preliminary test result.  A morespecific alternate chemical 
method must be used in order toobtain a confirmed analytical result.  Gas 
chromatography/mass spectrometry (GC/MS) is thepreferred confirmatory method.  
Other chemical confirmationmethods are available.  Clinical consideration and 
professional judgment should be applied to any drug of abusetest result, 
particularly when preliminary positive resultsare used.Unconfirmed screening 
results must not be used fornon-medical purposes (e.g., employment testing, 
legaltesting).

 

             URN BARBITURATE (test code = BARBITURN) NEGATIVE     <200 ng/mL    

             

 

             URN BENZODIAZEPINE (test code = BENZOURN) NEGATIVE     <200 ng/mL  

               

 

             URN OPIATES (test code = OPIATURN) NEGATIVE     <300 ng/mL         

        

 

             URN PHENCYCLIDINE (PCP) (test code = PHENCURN) NEGATIVE     <25 ng/

mL                  

 

             URN METHADONE (test code = METHAURN) NEGATIVE     <300 ng/mL       

          





Urine Source? Clean CatchURINALYSIS [id number] 07:41:00* 



             Test Item    Value        Reference Range Interpretation Comments

 

             UA COLOR (test code = COLU) COLORLESS    YELLOW       A            

 

 

             UA APPEARANCE (test code = APPU) CLEAR        CLEAR                

      

 

             UA GLUCOSE DIPSTICK (test code = DGLUU) NEGATIVE mg/dL NEGATIVE    

               

 

             UA BILIRUBIN DIPSTICK (test code = BILU) NEGATIVE mg/dL NEGATIVE   

                

 

             UA KETONE DIPSTICK (test code = KETU) NEGATIVE mg/dL NEGATIVE      

             

 

             UA SPECIFIC GRAVITY (test code = SGU) 1.002        1.001-1.035     

           

 

             UA BLOOD DIPSTICK (test code = VERNON) Negative mg/dL NEGATIVE        

           

 

             UA PH DIPSTICK (test code = ANJEL) 5.5          5.0-8.0              

      

 

             UA PROTEIN DIPSTICK (test code = PROU) NEGATIVE mg/dL NEGATIVE     

              

 

             UA UROBILINIOGEN DIPSTICK (test code = URO) Normal mg/dL NEGATIVE  

                 

 

             UA NITRITE DIPSTICK (test code = PETE) NEGATIVE     NEGATIVE       

            

 

             UA LEUKOCYTE ESTERASE W REFLEX (test code = LEUUR) NEGATIVE Giles/uL 

NEGATIVE                   

 

             UA WBC (test code = WBCU)  per HPF     0-5                        

 

             UA RBC (test code = RBCU)  per HPF     0-5                        

 

             UA EPITHELIAL CELLS (test code = EPIU)  per HPF     Few            

            

 

             UA BACTERIA (test code = BACU)  per HPF     NONE                   

    





Urine Source? Clean CatchDRUGS OF ABUSE SCREEN [id number] 07:41:00* 



             Test Item    Value        Reference Range Interpretation Comments

 

             URN COCAINE (test code = COCAURN)              <300 ng/mL          

       

 

             URN CANNABINOIDS (test code = CANNABURN)              <50 ng/mL    

              

 

             URN AMPHETAMINE (test code = AMPHETURN)              <1000 ng/mL   

             

 

             URN BARBITURATE (test code = BARBITURN)              <200 ng/mL    

             

 

             URN BENZODIAZEPINE (test code = BENZOURN)              <200 ng/mL  

               

 

             URN OPIATES (test code = OPIATURN)              <300 ng/mL         

        

 

             URN PHENCYCLIDINE (PCP) (test code = PHENCURN)              <25 ng/

mL                  

 

             URN METHADONE (test code = METHAURN)              <300 ng/mL       

          





Urine Source? Clean CatchBASIC METABOLIC [id number] 07:04:00* 



             Test Item    Value        Reference Range Interpretation Comments

 

             SODIUM (test code = NA) 137 mmol/L   136-145      N             

 

             POTASSIUM (test code = K) 3.5 mmol/L   3.5-5.1      N             

 

             CHLORIDE (test code = CL) 99.0 mmol/L         N             

 

             CARBON DIOXIDE (test code = CO2) 32.0 mmol/L  21-32        N       

      

 

             ANION GAP (test code = GAP) 9.5          10-20        L            

 

 

             GLUCOSE (test code = GLU) 88 mg/dL            N             

 

             BLOOD UREA NITROGEN (test code = BUN) 4 mg/dL      7-18         L  

           

 

             GLOMERULAR FILTRATION RATE (test code = GFR) > 60 mL/min  >=60     

                 Estimated GFR by

using Modified MDRD formula.Chronic kidney disease is defined as either kidney 
damageor GFR <60 mL/min/1.73 m2 for >3 months.

 

             CREATININE (test code = CREAT) 0.70 mg/dL   0.7-1.3      N         

    

 

             BUN/CREATININE RATIO (test code = BUN/CREA) 6.1          10-20     

   L             

 

             CALCIUM (test code = CA) 9.5 mg/dL    8.5-10.1     N             





HEPATIC FUNCTION [id number] 07:04:00* 



             Test Item    Value        Reference Range Interpretation Comments

 

             TOTAL PROTEIN (test code = PROT) 7.5 gram/dL  6.4-8.2      N       

      

 

             ALBUMIN (test code = ALB) 4.3 g/dL     3.4-5.0      N             

 

             GLOBULIN (test code = GLOB) 3.2 gram/dL  2.7-4.2      N            

 

 

             ALBUMIN/GLOBULIN RATIO (test code = A/G) 1.3          0.75-1.50    

N             

 

             BILIRUBIN TOTAL (test code = BILT) 0.40 mg/dL   0.0-1.0      N     

        

 

             BILIRUBIN DIRECT (test code = BILD) 0.14 mg/dL   0.0-0.20     N    

         

 

             SGOT/AST (test code = AST) 62 IUnit/L   15-37        H             

 

             SGPT/ALT (test code = ALT) 54 IUnit/L   12-78        N             

 

             ALKALINE PHOSPHATASE TOTAL (test code = ALKP) 64 IUnit/L     

     N            **Note change 

in reference range due to change in reagent.**





[id number] 07:04:00* 



             Test Item    Value        Reference Range Interpretation Comments

 

             ACETAMINOPHEN (test code = ACET) < 10 mcg/mL  10-30        L       

     A RANGE OF 10-30 mcg/mL IS 

A THERAPEUTIC RANGE.          TOXIC CONCENTRATIONS: >150 mcg/mL AT 4 HOURS AFTER
INGESTION >= 50 mcg/mL AT 12 HOURS AFTER INGESTION





[id number] 07:04:00* 



             Test Item    Value        Reference Range Interpretation Comments

 

             SALICYLATE (test code = SAL) 1.9 mg/dL    2.8-20.0     L           

  





[id number] 07:04:00* 



             Test Item    Value        Reference Range Interpretation Comments

 

             ALCOHOL (test code = ALC) 128 mg/dL    0.0-3.0      H            --

---------------INTERPRETIVE DATA

NOTE:-------------------- POSITIVE SCREENING RESULTS SHOULD BE CONSIDERED 
PRESUMPTIVE.WHEN COLLECTED FOR MEDICAL PURPOSES ONLY.  SPECIMEN WILL NOTBE 
COLLECTED BY CHAIN OF CUSTODY.IF A CONFIRMATION OF POSITIVE RESULTS IS DESIRED, 
ACONFIRMATION TEST MUST BE REQUESTED BY THE PHYSICIAN AT ANADDITIONAL CHARGE TO 
THE PATIENT.





CBC W/O [id number] 06:54:00* 



             Test Item    Value        Reference Range Interpretation Comments

 

             WHITE BLOOD CELL (test code = WBC) 10.0 K/mm3   4.5-12.5     N     

        

 

             RED BLOOD CELL (test code = RBC) 4.73 mill/mm3 4.0-5.8      N      

       

 

             HEMOGLOBIN (test code = HGB) 15.1 gram/dL 13.0-17.5    N           

  

 

             HEMATOCRIT (test code = HCT) 44.8 %       42.0-52.0    N           

  

 

             MEAN CELL VOLUME (test code = MCV) 94.7 fL      80-98        N     

        

 

             MEAN CELL HGB (test code = MCH) 31.9 picogram 27.0-33.0    N       

      

 

             MEAN CELL HGB CONCETRATION (test code = MCHC) 33.7 gram/dL 33.0-36.

0    N             

 

             RED CELL DISTRIBUTION WIDTH (test code = RDW) 12.1 %       11.6-16.

2    N             

 

             PLATELET COUNT (test code = PLT) 341 K/mm3    150-450              

      

 

             MEAN PLATELET VOLUME (test code = MPV) 10.2 fL      6.7-11.0     N 

            





BASIC METABOLIC [id number] 06:44:00* 



             Test Item    Value        Reference Range Interpretation Comments

 

             SODIUM (test code = NA) 137 mmol/L   136-145      N             

 

             POTASSIUM (test code = K) 3.5 mmol/L   3.5-5.1      N             

 

             CHLORIDE (test code = CL) 99.0 mmol/L         N             

 

             CARBON DIOXIDE (test code = CO2)  mmol/L      21-32                

      

 

             ANION GAP (test code = GAP)              10-20                     

 

 

             GLUCOSE (test code = GLU)  mg/dL                            

 

             BLOOD UREA NITROGEN (test code = BUN)  mg/dL       7-18            

           

 

             GLOMERULAR FILTRATION RATE (test code = GFR)  mL/min      >=60     

                  

 

             CREATININE (test code = CREAT)  mg/dL       0.7-1.3                

    

 

             BUN/CREATININE RATIO (test code = BUN/CREA)              10-20     

                 

 

             CALCIUM (test code = CA)  mg/dL       8.5-10.1                   





HEPATIC FUNCTION [id number] 06:44:00* 



             Test Item    Value        Reference Range Interpretation Comments

 

             TOTAL PROTEIN (test code = PROT)  gram/dL     6.4-8.2              

      

 

             ALBUMIN (test code = ALB)  g/dL        3.4-5.0                    

 

             GLOBULIN (test code = GLOB)  gram/dL     2.7-4.2                   

 

 

             ALBUMIN/GLOBULIN RATIO (test code = A/G)              0.75-1.50    

              

 

             BILIRUBIN TOTAL (test code = BILT)  mg/dL       0.0-1.0            

        

 

             BILIRUBIN DIRECT (test code = BILD)  mg/dL       0.0-0.20          

         

 

             SGOT/AST (test code = AST)  IUnit/L     15-37                      

 

             SGPT/ALT (test code = ALT)  IUnit/L     12-78                      

 

             ALKALINE PHOSPHATASE TOTAL (test code = ALKP)  IUnit/L       

                   





[id number] 06:44:00* 



             Test Item    Value        Reference Range Interpretation Comments

 

             ACETAMINOPHEN (test code = ACET)  mcg/mL      10-30                

      





[id number] 06:44:00* 



             Test Item    Value        Reference Range Interpretation Comments

 

             SALICYLATE (test code = SAL)  mg/dL       2.8-20.0                 

  





[id number] 06:44:00* 



             Test Item    Value        Reference Range Interpretation Comments

 

             ALCOHOL (test code = ALC)  mg/dL       0-3                        





URINALYSIS [id number] 05:56:00* 



             Test Item    Value        Reference Range Interpretation Comments

 

             UA COLOR (test code = COLU) YELLOW       YELLOW                    

 

 

             UA APPEARANCE (test code = APPU) CLEAR        CLEAR                

      

 

             UA GLUCOSE DIPSTICK (test code = DGLUU) NEGATIVE mg/dL NEGATIVE    

               

 

             UA BILIRUBIN DIPSTICK (test code = BILU) NEGATIVE mg/dL NEGATIVE   

                

 

             UA KETONE DIPSTICK (test code = KETU) TRACE mg/dL  NEGATIVE     A  

           

 

             UA SPECIFIC GRAVITY (test code = SGU) 1.027        1.001-1.035     

           

 

             UA BLOOD DIPSTICK (test code = VERNON) Negative mg/dL NEGATIVE        

           

 

             UA PH DIPSTICK (test code = ANJEL) 6.5          5.0-8.0              

      

 

             UA PROTEIN DIPSTICK (test code = PROU) 50 (1+) mg/dL NEGATIVE     A

             

 

             UA UROBILINIOGEN DIPSTICK (test code = URO) Normal mg/dL NEGATIVE  

                 

 

             UA NITRITE DIPSTICK (test code = PETE) NEGATIVE     NEGATIVE       

            

 

                    UA LEUKOCYTE ESTERASE W REFLEX (test code = LEUUR) 75 Giles/uL

 (1+) Giles/uL 

NEGATIVE                  A                          

 

             UA WBC (test code = WBCU) 11-20 per HPF 0-5          A             

 

             UA RBC (test code = RBCU) 3-5 #/HPF    0-5                        

 

             UA EPITHELIAL CELLS (test code = EPIU) None seen per HPF FEW       

                 

 

             UA BACTERIA (test code = BACU) FEW #/HPF    NONE         A         

    

 

             UA MUCUS (test code = MUCU) FEW #/LPF    FEW                       

 





Urine Source? Clean CatchDRUGS OF ABUSE SCREEN [id number] 05:56:00* 



             Test Item    Value        Reference Range Interpretation Comments

 

             URN COCAINE (test code = COCAURN) POSITIVE     <300 ng/mL   A      

      This test provides only 

a preliminary test result.  A morespecific alternate chemical method must be 
used in order toobtain a confirmed analytical result.  Gas chromatography/mass 
spectrometry (GC/MS) is thepreferred confirmatory method.  Other chemical 
confirmationmethods are available.  Clinical consideration and professional 
judgment should be applied to any drug of abusetest result, particularly when 
preliminary positive resultsare used.Unconfirmed screening results must not be 
used fornon-medical purposes (e.g., employment testing, legaltesting).

 

             URN CANNABINOIDS (test code = CANNABURN) POSITIVE     <50 ng/mL    

A            This test provides

only a preliminary test result.  A morespecific alternate chemical method must 
be used in order toobtain a confirmed analytical result.  Gas 
chromatography/mass spectrometry (GC/MS) is thepreferred confirmatory method.  
Other chemical confirmationmethods are available.  Clinical consideration and 
professional judgment should be applied to any drug of abusetest result, 
particularly when preliminary positive resultsare used.Unconfirmed screening 
results must not be used fornon-medical purposes (e.g., employment testing, 
legaltesting).

 

             URN AMPHETAMINE (test code = AMPHETURN) POSITIVE     <1000 ng/mL  A

            This test 

provides only a preliminary test result.  A morespecific alternate chemical 
method must be used in order toobtain a confirmed analytical result.  Gas 
chromatography/mass spectrometry (GC/MS) is thepreferred confirmatory method.  
Other chemical confirmationmethods are available.  Clinical consideration and 
professional judgment should be applied to any drug of abusetest result, 
particularly when preliminary positive resultsare used.Unconfirmed screening 
results must not be used fornon-medical purposes (e.g., employment testing, 
legaltesting).

 

             URN BARBITURATE (test code = BARBITURN) NEGATIVE     <200 ng/mL    

             

 

             URN BENZODIAZEPINE (test code = BENZOURN) NEGATIVE     <200 ng/mL  

               

 

             URN OPIATES (test code = OPIATURN) NEGATIVE     <300 ng/mL         

        

 

             URN PHENCYCLIDINE (PCP) (test code = PHENCURN) NEGATIVE     <25 ng/

mL                  

 

             URN METHADONE (test code = METHAURN) NEGATIVE     <300 ng/mL       

          





Urine Source? Clean CatchURINALYSIS [id number] 05:29:00* 



             Test Item    Value        Reference Range Interpretation Comments

 

             UA COLOR (test code = COLU) YELLOW       YELLOW                    

 

 

             UA APPEARANCE (test code = APPU) CLEAR        CLEAR                

      

 

             UA GLUCOSE DIPSTICK (test code = DGLUU) NEGATIVE mg/dL NEGATIVE    

               

 

             UA BILIRUBIN DIPSTICK (test code = BILU) NEGATIVE mg/dL NEGATIVE   

                

 

             UA KETONE DIPSTICK (test code = KETU) TRACE mg/dL  NEGATIVE     A  

           

 

             UA SPECIFIC GRAVITY (test code = SGU) 1.027        1.001-1.035     

           

 

             UA BLOOD DIPSTICK (test code = VERNON) Negative mg/dL NEGATIVE        

           

 

             UA PH DIPSTICK (test code = ANJEL) 6.5          5.0-8.0              

      

 

             UA PROTEIN DIPSTICK (test code = PROU) 50 (1+) mg/dL NEGATIVE     A

             

 

             UA UROBILINIOGEN DIPSTICK (test code = URO) Normal mg/dL NEGATIVE  

                 

 

             UA NITRITE DIPSTICK (test code = PETE) NEGATIVE     NEGATIVE       

            

 

                    UA LEUKOCYTE ESTERASE W REFLEX (test code = LEUUR) 75 Giles/uL

 (1+) Giles/uL 

NEGATIVE                  A                          

 

             UA WBC (test code = WBCU) 11-20 per HPF 0-5          A             

 

             UA RBC (test code = RBCU) 3-5 #/HPF    0-5                        

 

             UA EPITHELIAL CELLS (test code = EPIU) None seen per HPF FEW       

                 

 

             UA BACTERIA (test code = BACU) FEW #/HPF    NONE         A         

    

 

             UA MUCUS (test code = MUCU) FEW #/LPF    FEW                       

 





Urine Source? Clean CatchDRUGS OF ABUSE SCREEN [id number] 05:29:00* 



             Test Item    Value        Reference Range Interpretation Comments

 

             URN COCAINE (test code = COCAURN)              <300 ng/mL          

       

 

             URN CANNABINOIDS (test code = CANNABURN)              <50 ng/mL    

              

 

             URN AMPHETAMINE (test code = AMPHETURN)              <1000 ng/mL   

             

 

             URN BARBITURATE (test code = BARBITURN)              <200 ng/mL    

             

 

             URN BENZODIAZEPINE (test code = BENZOURN)              <200 ng/mL  

               

 

             URN OPIATES (test code = OPIATURN)              <300 ng/mL         

        

 

             URN PHENCYCLIDINE (PCP) (test code = PHENCURN)              <25 ng/

mL                  

 

             URN METHADONE (test code = METHAURN)              <300 ng/mL       

          





Urine Source? Clean CatchURINALYSIS [id number] 05:28:00* 



             Test Item    Value        Reference Range Interpretation Comments

 

             UA COLOR (test code = COLU) YELLOW       YELLOW                    

 

 

             UA APPEARANCE (test code = APPU) CLEAR        CLEAR                

      

 

             UA GLUCOSE DIPSTICK (test code = DGLUU) NEGATIVE mg/dL NEGATIVE    

               

 

             UA BILIRUBIN DIPSTICK (test code = BILU) NEGATIVE mg/dL NEGATIVE   

                

 

             UA KETONE DIPSTICK (test code = KETU) TRACE mg/dL  NEGATIVE     A  

           

 

             UA SPECIFIC GRAVITY (test code = SGU) 1.027        1.001-1.035     

           

 

             UA BLOOD DIPSTICK (test code = VERNON) Negative mg/dL NEGATIVE        

           

 

             UA PH DIPSTICK (test code = ANJEL) 6.5          5.0-8.0              

      

 

             UA PROTEIN DIPSTICK (test code = PROU) 50 (1+) mg/dL NEGATIVE     A

             

 

             UA UROBILINIOGEN DIPSTICK (test code = URO) Normal mg/dL NEGATIVE  

                 

 

             UA NITRITE DIPSTICK (test code = PETE) NEGATIVE     NEGATIVE       

            

 

                    UA LEUKOCYTE ESTERASE W REFLEX (test code = LEUUR) 75 Giles/uL

 (1+) Giles/uL 

NEGATIVE                  A                          

 

             UA WBC (test code = WBCU)  per HPF     0-5                        

 

             UA RBC (test code = RBCU)  per HPF     0-5                        

 

             UA EPITHELIAL CELLS (test code = EPIU)  per HPF     Few            

            

 

             UA BACTERIA (test code = BACU)  per HPF     NONE                   

    





Urine Source? Clean CatchDRUGS OF ABUSE SCREEN [id number] 05:28:00* 



             Test Item    Value        Reference Range Interpretation Comments

 

             URN COCAINE (test code = COCAURN)              <300 ng/mL          

       

 

             URN CANNABINOIDS (test code = CANNABURN)              <50 ng/mL    

              

 

             URN AMPHETAMINE (test code = AMPHETURN)              <1000 ng/mL   

             

 

             URN BARBITURATE (test code = BARBITURN)              <200 ng/mL    

             

 

             URN BENZODIAZEPINE (test code = BENZOURN)              <200 ng/mL  

               

 

             URN OPIATES (test code = OPIATURN)              <300 ng/mL         

        

 

             URN PHENCYCLIDINE (PCP) (test code = PHENCURN)              <25 ng/

mL                  

 

             URN METHADONE (test code = METHAURN)              <300 ng/mL       

          





Urine Source? Clean CatchCBC W/O [id number] 04:20:00* 



             Test Item    Value        Reference Range Interpretation Comments

 

             WHITE BLOOD CELL (test code = WBC) 13.3 K/mm3   4.5-12.5     H     

        

 

             RED BLOOD CELL (test code = RBC) 4.42 mill/mm3 4.0-5.8      N      

       

 

             HEMOGLOBIN (test code = HGB) 14.3 gram/dL 13.0-17.5    N           

  

 

             HEMATOCRIT (test code = HCT) 42.6 %       42.0-52.0    N           

  

 

             MEAN CELL VOLUME (test code = MCV) 96.4 fL      80-98        N     

        

 

             MEAN CELL HGB (test code = MCH) 32.4 picogram 27.0-33.0    N       

      

 

             MEAN CELL HGB CONCETRATION (test code = MCHC) 33.6 gram/dL 33.0-36.

0    N             

 

             RED CELL DISTRIBUTION WIDTH (test code = RDW) 12.4 %       11.6-16.

2    N             

 

             PLATELET COUNT (test code = PLT) 288 K/mm3    150-450      N       

      

 

             MEAN PLATELET VOLUME (test code = MPV) 10.8 fL      6.7-11.0     N 

            





BASIC METABOLIC [id number] 04:14:00* 



             Test Item    Value        Reference Range Interpretation Comments

 

             SODIUM (test code = NA) 140 mmol/L   136-145      N             

 

             POTASSIUM (test code = K) 3.4 mmol/L   3.5-5.1      L             

 

             CHLORIDE (test code = CL) 101.0 mmol/L        N             

 

             CARBON DIOXIDE (test code = CO2) 30.0 mmol/L  21-32        N       

      

 

             ANION GAP (test code = GAP) 12.4         10-20        N            

 

 

             GLUCOSE (test code = GLU) 103 mg/dL           N             

 

             BLOOD UREA NITROGEN (test code = BUN) 5 mg/dL      7-18         L  

           

 

             GLOMERULAR FILTRATION RATE (test code = GFR) > 60 mL/min  >=60     

                 Estimated GFR by

using Modified MDRD formula.Chronic kidney disease is defined as either kidney 
damageor GFR <60 mL/min/1.73 m2 for >3 months.

 

             CREATININE (test code = CREAT) 0.70 mg/dL   0.7-1.3      N         

    

 

             BUN/CREATININE RATIO (test code = BUN/CREA) 7.1          10-20     

   L             

 

             CALCIUM (test code = CA) 9.6 mg/dL    8.5-10.1     N             





HEPATIC FUNCTION [id number] 04:14:00* 



             Test Item    Value        Reference Range Interpretation Comments

 

             TOTAL PROTEIN (test code = PROT) 7.9 gram/dL  6.4-8.2      N       

      

 

             ALBUMIN (test code = ALB) 4.3 g/dL     3.4-5.0      N             

 

             GLOBULIN (test code = GLOB) 3.6 gram/dL  2.7-4.2      N            

 

 

             ALBUMIN/GLOBULIN RATIO (test code = A/G) 1.2          0.75-1.50    

N             

 

             BILIRUBIN TOTAL (test code = BILT) 0.60 mg/dL   0.0-1.0      N     

        

 

             BILIRUBIN DIRECT (test code = BILD) 0.15 mg/dL   0.0-0.20     N    

         

 

             SGOT/AST (test code = AST) 55 IUnit/L   15-37        H             

 

             SGPT/ALT (test code = ALT) 51 IUnit/L   12-78        N             

 

             ALKALINE PHOSPHATASE TOTAL (test code = ALKP) 61 IUnit/L     

     N            **Note change 

in reference range due to change in reagent.**





[id number] 04:14:00* 



             Test Item    Value        Reference Range Interpretation Comments

 

             ACETAMINOPHEN (test code = ACET) < 10 mcg/mL  10-30        L       

     A RANGE OF 10-30 mcg/mL IS 

A THERAPEUTIC RANGE.          TOXIC CONCENTRATIONS: >150 mcg/mL AT 4 HOURS AFTER
INGESTION >= 50 mcg/mL AT 12 HOURS AFTER INGESTION





[id number] 04:14:00* 



             Test Item    Value        Reference Range Interpretation Comments

 

             SALICYLATE (test code = SAL) 1.7 mg/dL    2.8-20.0     L           

  





[id number] 04:14:00* 



             Test Item    Value        Reference Range Interpretation Comments

 

             ALCOHOL (test code = ALC) 4 mg/dL      0.0-3.0      H            --

---------------INTERPRETIVE DATA 

NOTE:-------------------- POSITIVE SCREENING RESULTS SHOULD BE CONSIDERED 
PRESUMPTIVE.WHEN COLLECTED FOR MEDICAL PURPOSES ONLY.  SPECIMEN WILL NOTBE 
COLLECTED BY CHAIN OF CUSTODY.IF A CONFIRMATION OF POSITIVE RESULTS IS DESIRED, 
ACONFIRMATION TEST MUST BE REQUESTED BY THE PHYSICIAN AT ANADDITIONAL CHARGE TO 
THE PATIENT.





BASIC METABOLIC [id number] 04:12:00* 



             Test Item    Value        Reference Range Interpretation Comments

 

             SODIUM (test code = NA) 140 mmol/L   136-145      N             

 

             POTASSIUM (test code = K) 3.4 mmol/L   3.5-5.1      L             

 

             CHLORIDE (test code = CL) 101.0 mmol/L        N             

 

             CARBON DIOXIDE (test code = CO2)  mmol/L      21-32                

      

 

             ANION GAP (test code = GAP)              10-20                     

 

 

             GLUCOSE (test code = GLU)  mg/dL                            

 

             BLOOD UREA NITROGEN (test code = BUN)  mg/dL       7-18            

           

 

             GLOMERULAR FILTRATION RATE (test code = GFR)  mL/min      >=60     

                  

 

             CREATININE (test code = CREAT)  mg/dL       0.7-1.3                

    

 

             BUN/CREATININE RATIO (test code = BUN/CREA)              10-20     

                 

 

             CALCIUM (test code = CA)  mg/dL       8.5-10.1                   





HEPATIC FUNCTION [id number] 04:12:00* 



             Test Item    Value        Reference Range Interpretation Comments

 

             TOTAL PROTEIN (test code = PROT)  gram/dL     6.4-8.2              

      

 

             ALBUMIN (test code = ALB)  g/dL        3.4-5.0                    

 

             GLOBULIN (test code = GLOB)  gram/dL     2.7-4.2                   

 

 

             ALBUMIN/GLOBULIN RATIO (test code = A/G)              0.75-1.50    

              

 

             BILIRUBIN TOTAL (test code = BILT)  mg/dL       0.0-1.0            

        

 

             BILIRUBIN DIRECT (test code = BILD)  mg/dL       0.0-0.20          

         

 

             SGOT/AST (test code = AST)  IUnit/L     15-37                      

 

             SGPT/ALT (test code = ALT)  IUnit/L     12-78                      

 

             ALKALINE PHOSPHATASE TOTAL (test code = ALKP)  IUnit/L       

                   





[id number] 04:12:00* 



             Test Item    Value        Reference Range Interpretation Comments

 

             ACETAMINOPHEN (test code = ACET)  mcg/mL      10-30                

      





[id number] 04:12:00* 



             Test Item    Value        Reference Range Interpretation Comments

 

             SALICYLATE (test code = SAL)  mg/dL       2.8-20.0                 

  





[id number] 04:12:00* 



             Test Item    Value        Reference Range Interpretation Comments

 

             ALCOHOL (test code = ALC)  mg/dL       0-3

## 2020-11-12 NOTE — EMERGENCY DEPARTMENT NOTE
History of Present Illnes


History of Present Illness


Chief Complaint:  Drug Abuse/Intoxication


History of Present Illness


This is a 39 year old  male arrives to the ED after using some meth. Patient 

states he feels fine and wishes to go home. Patient states he told the police 

that he was having chest pain to come to the ER so that he wouldn't get 

arrested. .








8 Y/O MALE PT AAOX3 PRESENTS TO THE ER VIA EMS C/O TACHYCARDIA D/T METH USAGE;


   PT STATES HE SNORTED "0.2. A TINY LITTLE BAG"? X2 DAYS AGO; PT STATES HE ALSO


   DRANK ALCOHOL ALL DAY YESTERDAY, CONSUMING "A COUPLE OF 40OZ BEERS AND A 

   COUPLE


   OF LEFT OVER BEERS"; PT WAS TOLD HE COULD EITHER GO TO THE HOSPITAL OR GET


   ARRESTED; PT DENIES ANY COMPLAINTS AT THIS TIME; PT DENIES CP OR SOB; PT


   PLEASANT DURING TRIAGE.


Historian:  Patient, Paramedic/EMS


Arrival Mode:  [place]


Severity:  mild


Timing of current episode:  constant





Past Medical/Family History


Physician Review


I have reviewed the patient's past medical and family history.  Any updates have

been documented here.





Past Medical History


Recent Fever:  No


Clinical Suspicion of Infectio:  No


New/Unexplained Change in Ment:  No


Past Medical History:  None


Past Surgical History:  None





Social History


Smoking Cessation:  Current every day smoker


Counseling Performed:  Yes


Alcohol Use:  Daily


Any Illegal Drug Use:  Yes





Review of Systems


Review of Systems


Constitutional:  Reports no symptoms


EENTM:  Reports no symptoms


Cardiovascular:  Reports no symptoms


Respiratory:  Reports no symptoms


Gastrointestinal:  Reports no symptoms


Genitourinary:  Reports no symptoms


Musculoskeletal:  Reports no symptoms


Integumentary:  Reports no symptoms


Neurological:  Reports no symptoms


Psychological:  Reports no symptoms


Endocrine:  Reports no symptoms


Hematological/Lymphatic:  Reports no symptoms


Review of other systems:  All other systems negative





Physical Exam


Related Data


Allergies:  


Coded Allergies:  


     No Known Allergies (Unverified , 11/12/20)


Triage Vital Signs





Vital Signs








  Date Time  Temp Pulse Resp B/P (MAP) Pulse Ox O2 Delivery O2 Flow Rate FiO2


 


11/12/20 01:30 99.1 134 20 124/73 99 Room Air  








Vital signs reviewed:  Yes





Physical Exam


CONSTITUTIONAL





Constitutional:  Present well-developed, Present well-nourished


HENT


HENT:  Present normocephalic, Present atraumatic, Present oropharynx 

clear/moist, Present nose normal


HENT L/R:  Present left ext ear normal, Present right ext ear normal


EYES





Eyes:  Reports PERRL, Reports conjunctivae normal


NECK


Neck:  Present ROM normal


PULMONARY


Pulmonary:  Present effort normal, Present breath sounds normal


CARDIOVASCULAR





Cardiovascular:  Present regular rhythm, Present heart sounds normal, Present 

capillary refill normal, Present normal rate


GASTROINTESTINAL





Abdominal:  Present soft, Present nontender, Present bowel sounds normal


GENITOURINARY





Genitourinary:  Present exam deferred


SKIN


Skin:  Present warm, Present dry


MUSCULOSKELETAL





Musculoskeletal:  Present ROM normal


NEUROLOGICAL





Neurological:  Present alert, Present oriented x 3, Present no gross motor or 

sensory deficits


PSYCHOLOGICAL


Psychological:  Present mood/affect normal, Present judgement normal





Assessment & Plan


Medical Decision Making


MDM


31-year-old male denies any complaints, wishes to be discharged home. Patient 

had a safe discharge, a laboratory with no complaints.





Assessment & Plan


Final Impression:  


(1) Methamphetamine abuse


Depart Disposition:  HOME, SELF-CARE


Last Vital Signs











  Date Time  Temp Pulse Resp B/P (MAP) Pulse Ox O2 Delivery O2 Flow Rate FiO2


 


11/12/20 01:30 99.1 134 20 124/73 99 Room Air  

















EDENILSON CRUZ DO            Nov 12, 2020 02:00

## 2025-01-17 ENCOUNTER — HOSPITAL ENCOUNTER (EMERGENCY)
Dept: HOSPITAL 88 - FSED | Age: 45
Discharge: HOME | End: 2025-01-17
Payer: SELF-PAY

## 2025-01-17 VITALS — BODY MASS INDEX: 21.52 KG/M2 | HEIGHT: 68 IN | WEIGHT: 142 LBS

## 2025-01-17 VITALS — TEMPERATURE: 98.8 F | OXYGEN SATURATION: 98 % | HEART RATE: 84 BPM

## 2025-01-17 DIAGNOSIS — T63.301A: ICD-10-CM

## 2025-01-17 DIAGNOSIS — L03.116: Primary | ICD-10-CM

## 2025-01-17 PROCEDURE — 99283 EMERGENCY DEPT VISIT LOW MDM: CPT
